# Patient Record
Sex: FEMALE | Race: WHITE | NOT HISPANIC OR LATINO | URBAN - METROPOLITAN AREA
[De-identification: names, ages, dates, MRNs, and addresses within clinical notes are randomized per-mention and may not be internally consistent; named-entity substitution may affect disease eponyms.]

---

## 2018-02-09 ENCOUNTER — OFFICE VISIT - HEALTHEAST (OUTPATIENT)
Dept: FAMILY MEDICINE | Facility: CLINIC | Age: 43
End: 2018-02-09

## 2018-02-09 DIAGNOSIS — B07.0 PLANTAR WARTS: ICD-10-CM

## 2018-02-09 DIAGNOSIS — I73.00 RAYNAUD'S SYNDROME: ICD-10-CM

## 2018-02-09 DIAGNOSIS — Z00.00 VISIT FOR PREVENTIVE HEALTH EXAMINATION: ICD-10-CM

## 2018-02-09 DIAGNOSIS — N92.0 MENORRHAGIA WITH REGULAR CYCLE: ICD-10-CM

## 2018-02-09 DIAGNOSIS — T78.1XXA GASTROINTESTINAL FOOD SENSITIVITY: ICD-10-CM

## 2018-02-09 LAB
ALBUMIN SERPL-MCNC: 3.8 G/DL (ref 3.5–5)
ALP SERPL-CCNC: 42 U/L (ref 45–120)
ALT SERPL W P-5'-P-CCNC: 18 U/L (ref 0–45)
ANION GAP SERPL CALCULATED.3IONS-SCNC: 6 MMOL/L (ref 5–18)
AST SERPL W P-5'-P-CCNC: 23 U/L (ref 0–40)
BILIRUB SERPL-MCNC: 0.8 MG/DL (ref 0–1)
BUN SERPL-MCNC: 12 MG/DL (ref 8–22)
CALCIUM SERPL-MCNC: 9.5 MG/DL (ref 8.5–10.5)
CHLORIDE BLD-SCNC: 106 MMOL/L (ref 98–107)
CHOLEST SERPL-MCNC: 192 MG/DL
CO2 SERPL-SCNC: 28 MMOL/L (ref 22–31)
CREAT SERPL-MCNC: 0.7 MG/DL (ref 0.6–1.1)
ERYTHROCYTE [DISTWIDTH] IN BLOOD BY AUTOMATED COUNT: 12.1 % (ref 11–14.5)
FASTING STATUS PATIENT QL REPORTED: YES
GFR SERPL CREATININE-BSD FRML MDRD: >60 ML/MIN/1.73M2
GLUCOSE BLD-MCNC: 84 MG/DL (ref 70–125)
HCT VFR BLD AUTO: 41.9 % (ref 35–47)
HDLC SERPL-MCNC: 79 MG/DL
HGB BLD-MCNC: 13.7 G/DL (ref 12–16)
LDLC SERPL CALC-MCNC: 103 MG/DL
MCH RBC QN AUTO: 29.6 PG (ref 27–34)
MCHC RBC AUTO-ENTMCNC: 32.7 G/DL (ref 32–36)
MCV RBC AUTO: 91 FL (ref 80–100)
PLATELET # BLD AUTO: 294 THOU/UL (ref 140–440)
PMV BLD AUTO: 7.8 FL (ref 7–10)
POTASSIUM BLD-SCNC: 4.3 MMOL/L (ref 3.5–5)
PROT SERPL-MCNC: 6.7 G/DL (ref 6–8)
RBC # BLD AUTO: 4.63 MILL/UL (ref 3.8–5.4)
SODIUM SERPL-SCNC: 140 MMOL/L (ref 136–145)
TRIGL SERPL-MCNC: 49 MG/DL
TSH SERPL DL<=0.005 MIU/L-ACNC: 1.82 UIU/ML (ref 0.3–5)
WBC: 6.1 THOU/UL (ref 4–11)

## 2018-02-09 ASSESSMENT — MIFFLIN-ST. JEOR: SCORE: 1105.69

## 2018-02-12 LAB
25(OH)D3 SERPL-MCNC: 23.6 NG/ML (ref 30–80)
25(OH)D3 SERPL-MCNC: 23.6 NG/ML (ref 30–80)
HPV SOURCE: NORMAL
HUMAN PAPILLOMA VIRUS 16 DNA: NEGATIVE
HUMAN PAPILLOMA VIRUS 18 DNA: NEGATIVE
HUMAN PAPILLOMA VIRUS FINAL DIAGNOSIS: NORMAL
HUMAN PAPILLOMA VIRUS OTHER HR: NEGATIVE
SPECIMEN DESCRIPTION: NORMAL

## 2018-02-15 ENCOUNTER — COMMUNICATION - HEALTHEAST (OUTPATIENT)
Dept: FAMILY MEDICINE | Facility: CLINIC | Age: 43
End: 2018-02-15

## 2018-03-16 ENCOUNTER — HOSPITAL ENCOUNTER (OUTPATIENT)
Dept: MAMMOGRAPHY | Facility: CLINIC | Age: 43
Discharge: HOME | End: 2018-03-16
Attending: FAMILY MEDICINE

## 2018-03-16 DIAGNOSIS — Z00.00 VISIT FOR PREVENTIVE HEALTH EXAMINATION: ICD-10-CM

## 2018-03-28 ENCOUNTER — COMMUNICATION - HEALTHEAST (OUTPATIENT)
Dept: FAMILY MEDICINE | Facility: CLINIC | Age: 43
End: 2018-03-28

## 2018-04-04 ENCOUNTER — OFFICE VISIT - HEALTHEAST (OUTPATIENT)
Dept: FAMILY MEDICINE | Facility: CLINIC | Age: 43
End: 2018-04-04

## 2018-04-04 DIAGNOSIS — Z01.818 PRE-OP EXAM: ICD-10-CM

## 2018-04-04 DIAGNOSIS — N92.0 MENORRHAGIA WITH REGULAR CYCLE: ICD-10-CM

## 2018-04-04 DIAGNOSIS — Z01.818 ENCOUNTER FOR PREOPERATIVE EXAMINATION FOR GENERAL SURGICAL PROCEDURE: ICD-10-CM

## 2018-04-04 LAB
HCG UR QL: NEGATIVE
HGB BLD-MCNC: 12.6 G/DL (ref 12–16)
SP GR UR STRIP: 1.02 (ref 1–1.03)

## 2018-04-04 ASSESSMENT — MIFFLIN-ST. JEOR: SCORE: 1123.35

## 2018-04-11 ENCOUNTER — ANESTHESIA - HEALTHEAST (OUTPATIENT)
Dept: SURGERY | Facility: AMBULATORY SURGERY CENTER | Age: 43
End: 2018-04-11

## 2018-04-12 ENCOUNTER — SURGERY - HEALTHEAST (OUTPATIENT)
Dept: SURGERY | Facility: AMBULATORY SURGERY CENTER | Age: 43
End: 2018-04-12

## 2018-04-12 ASSESSMENT — MIFFLIN-ST. JEOR: SCORE: 1109.89

## 2018-04-27 ENCOUNTER — OFFICE VISIT - HEALTHEAST (OUTPATIENT)
Dept: FAMILY MEDICINE | Facility: CLINIC | Age: 43
End: 2018-04-27

## 2018-04-27 DIAGNOSIS — L84 CORNS AND CALLUS: ICD-10-CM

## 2018-04-27 ASSESSMENT — MIFFLIN-ST. JEOR: SCORE: 1105.35

## 2021-05-26 ENCOUNTER — RECORDS - HEALTHEAST (OUTPATIENT)
Dept: ADMINISTRATIVE | Facility: CLINIC | Age: 46
End: 2021-05-26

## 2021-05-27 ENCOUNTER — RECORDS - HEALTHEAST (OUTPATIENT)
Dept: ADMINISTRATIVE | Facility: CLINIC | Age: 46
End: 2021-05-27

## 2021-05-28 ENCOUNTER — RECORDS - HEALTHEAST (OUTPATIENT)
Dept: ADMINISTRATIVE | Facility: CLINIC | Age: 46
End: 2021-05-28

## 2021-05-31 VITALS — WEIGHT: 113.2 LBS | HEIGHT: 62 IN | BODY MASS INDEX: 20.83 KG/M2

## 2021-06-01 VITALS — HEIGHT: 62 IN | WEIGHT: 114 LBS | BODY MASS INDEX: 20.98 KG/M2

## 2021-06-01 VITALS — BODY MASS INDEX: 21.16 KG/M2 | HEIGHT: 62 IN | WEIGHT: 115 LBS

## 2021-06-01 VITALS — WEIGHT: 116.56 LBS | BODY MASS INDEX: 21.45 KG/M2 | HEIGHT: 62 IN

## 2021-06-02 ENCOUNTER — RECORDS - HEALTHEAST (OUTPATIENT)
Dept: ADMINISTRATIVE | Facility: CLINIC | Age: 46
End: 2021-06-02

## 2021-06-15 NOTE — PROGRESS NOTES
FEMALE ADULT PREVENTIVE EXAM      ASSESSMENT & PLAN  Elizabeth was seen today for annual exam.    Diagnoses and all orders for this visit:    Visit for preventive health examination  Routine history and physical, updated in EMR. Fasting lab work and pap done today.   -     Lipid Cooper FASTING  -     Comprehensive Metabolic Panel  -     Gynecologic Cytology (PAP Smear)  -     Vitamin D, Total (25-Hydroxy)  -     Thyroid Cascade  -     HM2(CBC w/o Differential)  -     Mammo Screening Bilateral; Future    Plantar warts  Calluses were paired down with 15 blade and freezing with liquid nitrogen performed. Patient tolerated procedure well.     Menorrhagia with regular cycle  Chronic problem. Reviewed previous US 10/2/2015 that showed endometrial polyp but nothing else. Symptoms have not changed over the last 2 years. Discussed OCP, IUD or possible ablation. Patient would like to hear second opinion from OB  - referral order placed.   -     Ambulatory referral to Obstetrics / Gynecology    Gastrointestinal food sensitivity  FODMAP food list printed and given to patient. Chronic issue. No red flag symptoms reported.     Raynaud's syndrome  Chronic since her 20's. No other symptoms for inflammatory disorder and patient's exam is unremarkable today. Unlikely scleroderma or CREST. Discussed possible nifedipine or other similar medications to manage her symptoms. Patient will think about this and return to clinic prn.     General  Immunizations reviewed and updated .  Alcohol use, safety and moderation discussed.  Recommended adequate calcium intake/osteoporosis prevention.  Discussed colon cancer screening at age 50, 45 if -American.  Diet and exercise reviewed, including goal of aerobic exercise 30-90 minutes most days of the week, moderation of portions sizes, avoiding eating out and fast food and increase in fruits and vegetables.  Discussed safe sex practices.  Discussed & recommended seat belt (& motorcycle helmet)  use.  Discussed & recommended smoke detector.  Discussed sun protection.  Discussed weight management.    This note was created using Dragon dictation software, spelling errors may occur.    Amber Frias MD      CHIEF COMPLAINT:  Female preventive exam.    SUBJECTIVE:  Elizabeth Alex is a 42 y.o. female who is previously healthy who is here for annual physical exam. She's a patient of Dr. Perales and her last annual physical was in 2015. Her main concern today is 3 warts on bottom of her left foot that she would like to have freezed today. She has had freezing before with liquid nitrogen before for other warts on bottom of her feet. Also she's concern about excessive bleeding with her menstrual period. She still has her periods regularly every month but it's very heavy and she would bleed through tampons/pads and would have to change every hour. She has had this issue for a couple years and has brought this up to her PCP during the last annual physical about 2 years ago and lab work at that time was normal and a pelvic US was obtained that showed small polyp but nothing else and she was recommended to either pursue OCP or IUD or ablation. Today she's unsure what she would like to do. She's gotten used to having heavy periods but knows that it does affect her quality of life; she doesn't want to take a pill every day and doesn't want to have IUD inserted either. She has 2 pregnancies that ended with normal vaginal delivery without any complications. Also she's concerned about Raynaud's. She has had cold fingers/toes when exposed to cold weather since she was in her 20's. She has gotten used to managing her symptoms but she likes to run in the winter and it does make it difficult for her to complete her runs. Also she brought up food sensitivity - whenever she eats dairy or certain types of food she would get abdominal bloating, discomfort and loose stool. Denies blood in stool, fever/ weight loss or night sweat.        She  has a past medical history of Raynaud phenomenon.    Lab Results   Component Value Date    WBC 6.5 09/12/2013    HGB 12.3 10/02/2015    HCT 40.6 09/12/2013    MCV 93 09/12/2013     09/12/2013     09/12/2013    K 4.2 09/12/2013    BUN 16 09/12/2013     Lab Results   Component Value Date    CHOL 168 08/16/2012    HDL 56 08/16/2012    LDLCALC 100 08/16/2012    TRIG 59 08/16/2012     Lab Results   Component Value Date    TSH 1.70 10/02/2015     BP Readings from Last 3 Encounters:   02/09/18 106/60   02/12/16 110/68   10/02/15 102/74       Surgeries:    Past Surgical History:   Procedure Laterality Date     [place] DRAINAGE OF KIDNEY      Description: Nephrostomy With Drainage;  Recorded: 07/01/2009;       Family History:  Her family history includes Cancer in her father; Dementia in her sister; Diabetes in her paternal grandfather; Down syndrome in her sister; Osteopenia in her maternal grandmother.    Social History:  She  reports that she has never smoked. She has never used smokeless tobacco. She reports that she does not drink alcohol or use illicit drugs.    Medications:    Current Outpatient Prescriptions:      MULTIVITAMIN ORAL, Take 1 tablet by mouth daily., Disp: , Rfl:   HELD MEDICATIONS: None.    Allergies:  No latex allergies.  No Known Allergies    RISK BEHAVIOR & HEALTH HABITS  History of abnormal pap smear: none.  Date of previous pap smear: 2012 and normal.  Mammography: 10/2015 and normal.  Self Breast Exam: Yes.  Colon/Flex Sig: N/a.  DEXA: N/a.   Regular Exercise: Yes, she's a runner.  Balanced diet: yes.  Seat Belt Use: yes.  Calcium intake/Osteoporosis prevention: yes.  Guns: NO  Sun Screen: YES  Dental Care: YES    REVIEW OF SYSTEMS:  Complete head to toe review of systems is otherwise negative except as above.    OBJECTIVE:  VITAL SIGNS:    Vitals:    02/09/18 1016   BP: 106/60   Pulse: 60   Resp: 14     GENERAL:  Patient alert, in no acute distress.  EYES: PERRLA. Extraoccular  movements intact, pupils equal, reactive to light and accommodation.  Normal conjunctiva and lids.   ENT:  Hearing grossly normal.  Normal appearance to ears and nose.  Bilateral TM s, external canals, oropharynx normal. Normal lips, gums and teeth.  Normal nasal mucosa.  NECK:  Supple, without thyromegaly or mass.  RESP:  Clear to auscultation without crackles, wheezes or distress.  Normal respiratory effort.   CV:  Regular rate and rhythm without murmurs, rubs or gallops.  Normal carotid, abdominal aorta, femoral and pedal pulses.  No varicosities or edema.  ABDOMEN:  Soft, non-tender, without hepatosplenomegaly, masses, or hernias.   BREASTS:  Nontender, without masses, nipple discharge, erythema, or axillary adenopathy.    :  Normal pelvic exam, including external genitalia with normal appearance and no lesions.  Normal vaginal exam, including no discharge and normal pelvic support, and no lesions.   Normal ureters, with no masses, tenerness or scarring.  Normal bladder, with no fullness, masses or tenderness.  Cervix well visualized, with normal appearance and no lesions or discharge.  Normal uterus, including normal size, shape, mobility with no tenderness.  Normal adnexa, including no nodules, masses or tenderness.  LYMPHATIC: Normal palpation of neck, groin and axilla..  No lymphadenopathy.  No bruising.  NEURO:  CN II-XII intact, motor & sensory function all intact.  DTR and reflexes normal.  PSYCHIATRIC:  Alert & oriented with normal mood and affect.  Good judgment and insight.  SKIN:  Normal inspection and palpation. Warts on plantar aspect of right foot with thickened calluses.   MUSCULOSKELETAL: Normal gait and station.  - Spine / Ribs / Pelvis: Normal inspection, ROM, stability and strength: Spine, Head, Neck, Upper and Lower Extremities.

## 2021-06-17 NOTE — ANESTHESIA PREPROCEDURE EVALUATION
Anesthesia Evaluation      Patient summary reviewed   No history of anesthetic complications     Airway   Mallampati: II  Neck ROM: full   Pulmonary - normal exam                          Cardiovascular   Exercise tolerance: > or = 4 METS  Rhythm: regular  Rate: normal,         Neuro/Psych - negative ROS     Endo/Other       GI/Hepatic/Renal - negative ROS      Other findings: Thin, fit, INAD      Dental - normal exam                        Anesthesia Plan  Planned anesthetic: MAC    ASA 1   Induction: intravenous   Anesthetic plan and risks discussed with: patient and spouse  Anesthesia plan special considerations: antiemetics,   Post-op plan: routine recovery

## 2021-06-17 NOTE — ANESTHESIA CARE TRANSFER NOTE
Last vitals:   Vitals:    04/12/18 1645   BP: 137/80   Pulse: (!) 49   Resp: 16   Temp:    SpO2: 100%     Patient's level of consciousness is drowsy  Spontaneous respirations: yes  Maintains airway independently: yes  Dentition unchanged: yes  Oropharynx: oropharynx clear of all foreign objects    QCDR Measures:  ASA# 20 - Surgical Safety Checklist: WHO surgical safety checklist completed prior to induction  PQRS# 430 - Adult PONV Prevention: 4558F - Pt received => 2 anti-emetic agents (different classes) preop & intraop  ASA# 8 - Peds PONV Prevention: NA - Not pediatric patient, not GA or 2 or more risk factors NOT present  PQRS# 424 - Marsha-op Temp Management: 4559F - At least one body temp DOCUMENTED => 35.5C or 95.9F within required timeframe  PQRS# 426 - PACU Transfer Protocol: - Transfer of care checklist used  ASA# 14 - Acute Post-op Pain: ASA14B - Patient did NOT experience pain >= 7 out of 10

## 2021-06-17 NOTE — ANESTHESIA POSTPROCEDURE EVALUATION
Patient: Elizabeth Lisaom  HYSTEROSCOPY, WITH DILATION AND CURETTAGE OF UTERUSD ENDOMETRIAL ABLATION; POLYPECTOMY  Anesthesia type: MAC    Patient location: PACU  Last vitals:   Vitals:    04/12/18 1645   BP: 137/80   Pulse: (!) 49   Resp: 16   Temp:    SpO2: 100%     Post vital signs: stable  Level of consciousness: awake and responds to simple questions  Post-anesthesia pain: pain controlled  Post-anesthesia nausea and vomiting: no  Pulmonary: unassisted, return to baseline  Cardiovascular: stable and blood pressure at baseline  Hydration: adequate  Anesthetic events: no    QCDR Measures:  ASA# 11 - Marsha-op Cardiac Arrest: ASA11B - Patient did NOT experience unanticipated cardiac arrest  ASA# 12 - Marsha-op Mortality Rate: ASA12B - Patient did NOT die  ASA# 13 - PACU Re-Intubation Rate: NA - No ETT / LMA used for case  ASA# 10 - Composite Anes Safety: ASA10A - No serious adverse event    Additional Notes:

## 2021-06-17 NOTE — PROGRESS NOTES
"  Assessment:     1. Corns and callus       Discussed the case about cons and callus.  Discussed over-the-counter Dr. Hester's corn pads and paring and removing the keratin plugs.  Use good supportive shoes with memory foam to avoid friction.     Plan:    1. Paring done with 15\" scalpel  2.Liquid nitrogen was applied t  3. The patient will return at 2-4 week intervals for retreatment's as needed.     Subjective:       Elizabeth Alex is a 42 y.o. female who needs retreatment of plantar wart, however on exam these appear to be more consistent with corns and calluses.       Objective:      Skin: 2 plantar corn noted on base of first MTP joint and on the base of the third MTP joint    "

## 2021-06-17 NOTE — PROGRESS NOTES
Preoperative Exam    Scheduled Procedure: HYSTEROSCOPY, WITH DILATION AND CURETTAGE OF UTERINE ENDOMETRIAL ABLATION   Surgery Date:  04/12/18  Surgery Location: [facility] [place], fax [phone]    Surgeon:  Dr Robert    Assessment/Plan:     1. Pre-op exam----uterine ablation, D&C  Cleared for surgery    - Pregnancy (Beta-hCG, Qual), Urine  - Hemoglobin    2. Menorrhagia with regular cycle  As above        Surgical Procedure Risk: Low (reported cardiac risk generally < 1%)  Have you had prior anesthesia?: No  Have you or any family members had a previous anesthesia reaction:  No  Do you or any family members have a history of a clotting or bleeding disorder?: No  Cardiac Risk Assessment: no increased risk for major cardiac complications    Patient approved for surgery with general or local anesthesia.      Functional Status: Independent  Patient plans to recover at home with family.     Subjective:      Elizabeth Alex is a 42 y.o. female who presents for a preoperative consultation.  Pt with h/o heavy menses for at least the last 4 years.  Heavy bleeding w/ clotting on day 3 of cycle--->impact life activities.  No excess fatigue, not lightheaded.   Initial evaluation in '15 with u/s that showed small endometrial polyp and pt deferred treatment.  Consult with Dr Robert in March and discussed polyp bx, D&C and ablation which pt wants to proceed with.  Physical in 2/18 w/ normal pap, Hgb, and thyroid.  LMP 3/26/18       All other systems reviewed and are negative, other than those listed in the HPI.    Pertinent History  Do you have difficulty breathing or chest pain after walking up a flight of stairs: No  History of obstructive sleep apnea: No  Steroid use in the last 6 months: No  Frequent Aspirin/NSAID use: No  Prior Blood Transfusion: No  Prior Blood Transfusion Reaction: No  If for some reason prior to, during or after the procedure, if it is medically indicated, would you be willing to have a  "blood transfusion?:  There is no transfusion refusal.    Current Outpatient Prescriptions   Medication Sig Dispense Refill     cholecalciferol, vitamin D3, (VITAMIN D3) 2,000 unit capsule Take 1,000 Units by mouth daily.       MULTIVITAMIN ORAL Take 1 tablet by mouth daily.       No current facility-administered medications for this visit.         No Known Allergies    Patient Active Problem List   Diagnosis     Raynaud's Disease     Chondromalacia of knee, left     Gastrointestinal food sensitivity     Menorrhagia with regular cycle     Family history of colonic polyps     Hemorrhoids       Past Medical History:   Diagnosis Date     Raynaud phenomenon        Past Surgical History:   Procedure Laterality Date     [place] DRAINAGE OF KIDNEY      Description: Nephrostomy With Drainage;  Recorded: 07/01/2009;       Social History     Social History     Marital status:      Spouse name: N/A     Number of children: 2     Years of education: N/A     Occupational History     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     Social History Main Topics     Smoking status: Never Smoker     Smokeless tobacco: Never Used     Alcohol use No     Drug use: No     Sexual activity: Not on file     Other Topics Concern     Not on file     Social History Narrative       Patient Care Team:  Winnie Perales MD as PCP - General          Objective:     Vitals:    04/04/18 0917   BP: 108/68   Pulse: 60   Resp: 16   Temp: 98.5  F (36.9  C)   TempSrc: Oral   Weight: 116 lb 9 oz (52.9 kg)   Height: 5' 2.4\" (1.585 m)   LMP: 03/26/2018         Physical Exam:      General Appearance:    Alert, cooperative, no distress, appears stated age   Head:    Normocephalic, without obvious abnormality, atraumatic   Eyes:    PERRL, conjunctiva/corneas clear, EOM's intact both eyes   Ears:    Normal TM's and external ear canals, both ears   Nose:   Nares normal, septum midline, mucosa normal   Throat:   Lips, mucosa, and tongue " normal; teeth and gums normal   Neck:   Supple, symmetrical, trachea midline, no adenopathy;     thyroid:  no enlargement/tenderness/nodules; no carotid    bruit or JVD   Back:     Symmetric, no curvature, no CVA tenderness   Lungs:     Clear to auscultation bilaterally, respirations unlabored   Chest Wall:    No tenderness or deformity    Heart:    Regular rate and rhythm, S1 and S2 normal, no murmur, rub   or gallop       Abdomen:     Soft, non-tender, bowel sounds active all four quadrants,     no masses, no organomegaly   Genitalia:   deferred   Rectal:   deferred   Extremities:   Extremities normal, atraumatic, no cyanosis or edema   Pulses:   2+ and symmetric all extremities   Skin:   Skin color, texture, turgor normal, no rashes or lesions       Neurologic:   CNII-XII intact             Patient Instructions     Hold all supplements, aspirin and NSAIDs for 7 days prior to surgery.  Follow your surgeon's direction on when to stop eating and drinking prior to surgery.  Your surgeon will be managing your pain after your surgery.    Remove all jewelry and metal piercings before your surgery.   Remove nail polish from fingers before surgery.          Labs:  Hemoglobin pending    Recent Results (from the past 24 hour(s))   Pregnancy (Beta-hCG, Qual), Urine    Collection Time: 04/04/18  9:14 AM   Result Value Ref Range    Pregnancy Test, Urine Negative Negative    Specific Gravity, UA 1.020 1.001 - 1.030       Immunization History   Administered Date(s) Administered     Hep A, historic 05/30/2007, 07/01/2009     Influenza [id number], 12/23/2009     Influenza, inj, historic,unspecified 11/03/2008, 10/10/2017     Influenza, seasonal,quad inj 36+ mos 10/02/2015     Influenza, seasonal,quad inj 6-35 mos 10/05/2009, 11/16/2010, 10/27/2011, 12/06/2012, 09/12/2013     Td,adult,historic,unspecified 12/08/2003     Tdap 09/06/2011           Electronically signed by Winnie Perales MD 04/04/18 9:21 AM

## 2021-06-26 ENCOUNTER — HEALTH MAINTENANCE LETTER (OUTPATIENT)
Age: 46
End: 2021-06-26

## 2021-10-16 ENCOUNTER — HEALTH MAINTENANCE LETTER (OUTPATIENT)
Age: 46
End: 2021-10-16

## 2022-07-23 ENCOUNTER — HEALTH MAINTENANCE LETTER (OUTPATIENT)
Age: 47
End: 2022-07-23

## 2022-10-01 ENCOUNTER — HEALTH MAINTENANCE LETTER (OUTPATIENT)
Age: 47
End: 2022-10-01

## 2023-08-06 ENCOUNTER — HEALTH MAINTENANCE LETTER (OUTPATIENT)
Age: 48
End: 2023-08-06

## 2024-05-16 ENCOUNTER — LAB REQUISITION (OUTPATIENT)
Dept: LAB | Facility: CLINIC | Age: 49
End: 2024-05-16

## 2024-05-16 DIAGNOSIS — Z12.4 ENCOUNTER FOR SCREENING FOR MALIGNANT NEOPLASM OF CERVIX: ICD-10-CM

## 2024-05-16 PROCEDURE — 87624 HPV HI-RISK TYP POOLED RSLT: CPT | Performed by: OBSTETRICS & GYNECOLOGY

## 2024-05-16 PROCEDURE — G0145 SCR C/V CYTO,THINLAYER,RESCR: HCPCS | Performed by: OBSTETRICS & GYNECOLOGY

## 2024-05-17 LAB
HPV HR 12 DNA CVX QL NAA+PROBE: NEGATIVE
HPV16 DNA CVX QL NAA+PROBE: NEGATIVE
HPV18 DNA CVX QL NAA+PROBE: NEGATIVE
HUMAN PAPILLOMA VIRUS FINAL DIAGNOSIS: NORMAL

## 2024-05-22 LAB
BKR LAB AP GYN ADEQUACY: NORMAL
BKR LAB AP GYN INTERPRETATION: NORMAL
PATH REPORT.COMMENTS IMP SPEC: NORMAL
PATH REPORT.COMMENTS IMP SPEC: NORMAL